# Patient Record
(demographics unavailable — no encounter records)

---

## 2025-01-09 NOTE — PHYSICAL EXAM
[Alert] : alert [Tired appearing] : tired appearing [EOMI] : grossly EOMI [Clear] : right tympanic membrane clear [Acute Distress] : no acute distress [Tenderness] : no tenderness [Pink Nasal Mucosa] : nasal mucosa not pink [Erythematous Oropharynx] : nonerythematous oropharynx

## 2025-01-09 NOTE — DISCUSSION/SUMMARY
[FreeTextEntry1] : BRANDYN presents today with flu like illness, POCT flu was negative. Will treat his fever with antipyretics.